# Patient Record
Sex: MALE | Race: BLACK OR AFRICAN AMERICAN | URBAN - METROPOLITAN AREA
[De-identification: names, ages, dates, MRNs, and addresses within clinical notes are randomized per-mention and may not be internally consistent; named-entity substitution may affect disease eponyms.]

---

## 2021-07-06 ENCOUNTER — THERAPY VISIT (OUTPATIENT)
Dept: PHYSICAL THERAPY | Facility: CLINIC | Age: 31
End: 2021-07-06
Payer: COMMERCIAL

## 2021-07-06 DIAGNOSIS — M54.2 NECK PAIN: ICD-10-CM

## 2021-07-06 PROCEDURE — 97110 THERAPEUTIC EXERCISES: CPT | Mod: GP | Performed by: PHYSICAL THERAPIST

## 2021-07-06 PROCEDURE — 97162 PT EVAL MOD COMPLEX 30 MIN: CPT | Mod: GP | Performed by: PHYSICAL THERAPIST

## 2021-07-06 NOTE — LETTER
CHANDNI [facility]  [address]  [facility] [address]  [phone]    2021    Re: Isael Sampson   :   1990  MRN:  [id number]   REFERRING PHYSICIAN:   Sheng TARIQ [facility]    Date of Initial Evaluation:  2021  Visits:  Rxs Used: 1  Reason for Referral:  Neck pain    EVALUATION SUMMARY    Physical Therapy Initial Evaluation  Subjective:  The history is provided by the patient. No  was used.   Therapist Generated HPI Evaluation   Type of problem:  Cervical spine.  This is a recurrent condition.  Condition occurred with:  Insidious onset.  Where condition occurred: for unknown reasons.  Patient reports pain:  Cervical right side (medial R scapulae).  Pain is described as shooting and sharp and is intermittent.  Pain is worse in the A.M..  Since onset symptoms are gradually improving.  Associated symptoms:  Loss of motion/stiffness. Symptoms are exacerbated by lying down, sitting and looking up or down (sleeps with 1 pillow on his side)  and relieved by ice and heat (tennis ball on muscle).  Barriers include:  None as reported by patient.    Patient Health History  Isael Sampson being seen for R shoulder blade/neck pain.   Problem began: 4/15/2021.   Problem occurred: insiduous   Pain is reported as 4/10 on pain scale.  General health as reported by patient is fair.  Pertinent medical history includes: chemical dependency, depression and smoking.   Red flags:  None as reported by patient.  Medical allergies: none.   Surgeries include:  None.    Current medications:  None.    Current occupation is none.       Objective:  System     Cervical/Thoracic Evaluation  Headaches: none  Cervical Myotomes:  normal  Re: Isael Sampson   :   1990    Sung Cervical Evaluation  Posture:  Sitting: fair  Standing: good  Protruding Head: yes  Wry Neck:  no  Correction of Posture: no effect  Movement Loss:  Flexion (Flex): nil and pain  Retraction (RET): nil  Extension (EXT): min and pain  Lateral Flexion Right (LF R): nil and pain  Lateral Flexion Left (LF L): nil  Rotation Right (ROT R): nil  Rotation Left (ROT L): nil  Test Movements:  RET: During: no effect  After: peripheralizing  Mechanical Response: no effect  Repeat RET: During: no effect  After: peripheralizing  Mechanical Response: no effect  RET (Lying): During: abolishes  After: better  Mechanical Response: IncROM  Repeat RET (Lying): During: abolishes  After: better  Mechanical Response: IncROM  Conclusion: derangement  Principle of Treatment:  Posture Correction: postural education  Extension: supine cervical retraction x 10-15 repetitions, every 2-3 hrs    Assessment/Plan:    Patient is a 31 year old male with R shoulder blade/neck complaints.    Patient has the following significant findings with corresponding treatment plan.                Diagnosis 1:  R shoulder blade/neck pain secondary to derangement  Pain -  directional preference exercise and home program  Decreased ROM/flexibility - manual therapy, therapeutic exercise and home program  Decreased function - therapeutic activities and home program  Impaired posture - neuro re-education and home program    Therapy Evaluation Codes:   1) History comprised of:   Personal factors that impact the plan of care:      Time since onset of symptoms.    Comorbidity factors that impact the plan of care are:      Chemical Dependency, Depression and Smoking.     Medications impacting care: None.  2) Examination of Body Systems comprised of:   Body structures and functions that impact the plan of care:      Cervical spine.   Activity limitations that impact the plan of care are:      Reading/Computer work, Sitting and Sleeping.  3) Clinical presentation characteristics are:   Evolving/Changing.  4) Decision-Making    Moderate complexity using standardized  patient assessment instrument and/or measureable assessment of functional outcome.  Re: Isael CHANDNI Adrián   :   1990    Cumulative Therapy Evaluation is: Moderate complexity.  Previous and current functional limitations:  (See Goal Flow Sheet for this information)    Short term and Long term goals: (See Goal Flow Sheet for this information)   Communication ability:  Patient appears to be able to clearly communicate and understand verbal and written communication and follow directions correctly.  Treatment Explanation - The following has been discussed with the patient:   RX ordered/plan of care  Anticipated outcomes  Possible risks and side effects  This patient would benefit from PT intervention to resume normal activities.   Rehab potential is good.  Frequency:  1 X week, once daily  Duration:  for 6 weeks  Discharge Plan:  Achieve all LTG.  Independent in home treatment program.  Reach maximal therapeutic benefit.    Thank you for your referral.    INQUIRIES  Therapist: Alexandra Hickman PT  [facility] [place]  [address] [address]  Phone: [phone]  Fax: [phone]

## 2021-07-06 NOTE — PROGRESS NOTES
Physical Therapy Initial Evaluation  Subjective:  The history is provided by the patient. No  was used.   Therapist Generated HPI Evaluation         Type of problem:  Cervical spine.    This is a recurrent condition.  Condition occurred with:  Insidious onset.  Where condition occurred: for unknown reasons.  Patient reports pain:  Cervical right side (medial R scapulae).  Pain is described as shooting and sharp and is intermittent.  Pain is worse in the A.M..  Since onset symptoms are gradually improving.  Associated symptoms:  Loss of motion/stiffness. Symptoms are exacerbated by lying down, sitting and looking up or down (sleeps with 1 pillow on his side)  and relieved by ice and heat (tennis ball on muscle).      Barriers include:  None as reported by patient.    Patient Health History  Isael Sampson being seen for R shoulder blade/neck pain.     Problem began: 4/15/2021.   Problem occurred: insiduous   Pain is reported as 4/10 on pain scale.  General health as reported by patient is fair.  Pertinent medical history includes: chemical dependency, depression and smoking.   Red flags:  None as reported by patient.  Medical allergies: none.   Surgeries include:  None.    Current medications:  None.    Current occupation is none.                                       Objective:  System              Cervical/Thoracic Evaluation    Headaches: none  Cervical Myotomes:  normal                                                                          Sung Cervical Evaluation    Posture:  Sitting: fair  Standing: good  Protruding Head: yes  Wry Neck: no  Correction of Posture: no effect    Movement Loss:    Flexion (Flex): nil and pain  Retraction (RET): nil  Extension (EXT): min and pain  Lateral Flexion Right (LF R): nil and pain  Lateral Flexion Left (LF L): nil  Rotation Right (ROT R): nil  Rotation Left (ROT L): nil  Test Movements:      RET: During: no effect  After: peripheralizing   Mechanical Response: no effect  Repeat RET: During: no effect  After: peripheralizing  Mechanical Response: no effect      RET (Lying): During: abolishes  After: better  Mechanical Response: IncROM  Repeat RET (Lying): During: abolishes  After: better  Mechanical Response: IncROM                    Conclusion: derangement  Principle of Treatment:  Posture Correction: postural education    Extension: supine cervical retraction x 10-15 repetitions, every 2-3 hrs                                             ROS    Assessment/Plan:    Patient is a 31 year old male with R shoulder blade/neck complaints.    Patient has the following significant findings with corresponding treatment plan.                Diagnosis 1:  R shoulder blade/neck pain secondary to derangement  Pain -  directional preference exercise and home program  Decreased ROM/flexibility - manual therapy, therapeutic exercise and home program  Decreased function - therapeutic activities and home program  Impaired posture - neuro re-education and home program    Therapy Evaluation Codes:   1) History comprised of:   Personal factors that impact the plan of care:      Time since onset of symptoms.    Comorbidity factors that impact the plan of care are:      Chemical Dependency, Depression and Smoking.     Medications impacting care: None.  2) Examination of Body Systems comprised of:   Body structures and functions that impact the plan of care:      Cervical spine.   Activity limitations that impact the plan of care are:      Reading/Computer work, Sitting and Sleeping.  3) Clinical presentation characteristics are:   Evolving/Changing.  4) Decision-Making    Moderate complexity using standardized patient assessment instrument and/or measureable assessment of functional outcome.  Cumulative Therapy Evaluation is: Moderate complexity.    Previous and current functional limitations:  (See Goal Flow Sheet for this information)    Short term and Long term goals:  (See Goal Flow Sheet for this information)     Communication ability:  Patient appears to be able to clearly communicate and understand verbal and written communication and follow directions correctly.  Treatment Explanation - The following has been discussed with the patient:   RX ordered/plan of care  Anticipated outcomes  Possible risks and side effects  This patient would benefit from PT intervention to resume normal activities.   Rehab potential is good.    Frequency:  1 X week, once daily  Duration:  for 6 weeks  Discharge Plan:  Achieve all LTG.  Independent in home treatment program.  Reach maximal therapeutic benefit.    Please refer to the daily flowsheet for treatment today, total treatment time and time spent performing 1:1 timed codes.

## 2021-08-26 PROBLEM — M54.2 NECK PAIN: Status: RESOLVED | Noted: 2021-07-06 | Resolved: 2021-08-26
